# Patient Record
Sex: MALE | Race: BLACK OR AFRICAN AMERICAN
[De-identification: names, ages, dates, MRNs, and addresses within clinical notes are randomized per-mention and may not be internally consistent; named-entity substitution may affect disease eponyms.]

---

## 2019-11-13 ENCOUNTER — HOSPITAL ENCOUNTER (INPATIENT)
Dept: HOSPITAL 61 - ER | Age: 29
LOS: 1 days | Discharge: HOME | DRG: 872 | End: 2019-11-14
Attending: FAMILY MEDICINE | Admitting: FAMILY MEDICINE
Payer: COMMERCIAL

## 2019-11-13 VITALS — SYSTOLIC BLOOD PRESSURE: 126 MMHG | DIASTOLIC BLOOD PRESSURE: 77 MMHG

## 2019-11-13 VITALS — SYSTOLIC BLOOD PRESSURE: 96 MMHG | DIASTOLIC BLOOD PRESSURE: 47 MMHG

## 2019-11-13 VITALS — SYSTOLIC BLOOD PRESSURE: 107 MMHG | DIASTOLIC BLOOD PRESSURE: 57 MMHG

## 2019-11-13 VITALS — HEIGHT: 71 IN | BODY MASS INDEX: 30.8 KG/M2 | WEIGHT: 220 LBS

## 2019-11-13 VITALS — SYSTOLIC BLOOD PRESSURE: 132 MMHG | DIASTOLIC BLOOD PRESSURE: 70 MMHG

## 2019-11-13 VITALS — DIASTOLIC BLOOD PRESSURE: 57 MMHG | SYSTOLIC BLOOD PRESSURE: 129 MMHG

## 2019-11-13 DIAGNOSIS — R04.2: ICD-10-CM

## 2019-11-13 DIAGNOSIS — J05.10: ICD-10-CM

## 2019-11-13 DIAGNOSIS — Z82.49: ICD-10-CM

## 2019-11-13 DIAGNOSIS — A41.9: Primary | ICD-10-CM

## 2019-11-13 DIAGNOSIS — R49.0: ICD-10-CM

## 2019-11-13 DIAGNOSIS — J03.90: ICD-10-CM

## 2019-11-13 DIAGNOSIS — F17.210: ICD-10-CM

## 2019-11-13 LAB
% BANDS: 2 % (ref 0–9)
% LYMPHS: 9 % (ref 24–48)
% MONOS: 2 % (ref 0–10)
% SEGS: 87 % (ref 35–66)
ALBUMIN SERPL-MCNC: 4.3 G/DL (ref 3.4–5)
ALP SERPL-CCNC: 57 U/L (ref 46–116)
ALT SERPL-CCNC: 24 U/L (ref 16–63)
ANION GAP SERPL CALC-SCNC: 10 MMOL/L (ref 6–14)
APTT PPP: YELLOW S
AST SERPL-CCNC: 20 U/L (ref 15–37)
BACTERIA #/AREA URNS HPF: 0 /HPF
BASOPHILS # BLD AUTO: 0.1 X10^3/UL (ref 0–0.2)
BASOPHILS NFR BLD: 0 % (ref 0–3)
BILIRUB DIRECT SERPL-MCNC: 0.2 MG/DL (ref 0–0.2)
BILIRUB SERPL-MCNC: 0.6 MG/DL (ref 0.2–1)
BILIRUB UR QL STRIP: NEGATIVE
BUN SERPL-MCNC: 13 MG/DL (ref 8–26)
CALCIUM SERPL-MCNC: 9.2 MG/DL (ref 8.5–10.1)
CHLORIDE SERPL-SCNC: 103 MMOL/L (ref 98–107)
CO2 SERPL-SCNC: 27 MMOL/L (ref 21–32)
CREAT SERPL-MCNC: 1.1 MG/DL (ref 0.7–1.3)
EOSINOPHIL NFR BLD: 0.2 X10^3/UL (ref 0–0.7)
EOSINOPHIL NFR BLD: 1 % (ref 0–3)
ERYTHROCYTE [DISTWIDTH] IN BLOOD BY AUTOMATED COUNT: 13.1 % (ref 11.5–14.5)
FIBRINOGEN PPP-MCNC: CLEAR MG/DL
GFR SERPLBLD BASED ON 1.73 SQ M-ARVRAT: 95.8 ML/MIN
GLUCOSE SERPL-MCNC: 114 MG/DL (ref 70–99)
HCT VFR BLD CALC: 40.6 % (ref 39–53)
HGB BLD-MCNC: 13.7 G/DL (ref 13–17.5)
INFLUENZA A PATIENT: NEGATIVE
INFLUENZA B PATIENT: NEGATIVE
LYMPHOCYTES # BLD: 1.9 X10^3/UL (ref 1–4.8)
LYMPHOCYTES NFR BLD AUTO: 8 % (ref 24–48)
MCH RBC QN AUTO: 30 PG (ref 25–35)
MCHC RBC AUTO-ENTMCNC: 34 G/DL (ref 31–37)
MCV RBC AUTO: 88 FL (ref 79–100)
MONO #: 1.6 X10^3/UL (ref 0–1.1)
MONOCYTES NFR BLD: 7 % (ref 0–9)
MONONUCLEOSIS PATIENT: NEGATIVE
NEUT #: 18.8 X10^3/UL (ref 1.8–7.7)
NEUTROPHILS NFR BLD AUTO: 84 % (ref 31–73)
NITRITE UR QL STRIP: NEGATIVE
PH UR STRIP: 8 [PH]
PLATELET # BLD AUTO: 266 X10^3/UL (ref 140–400)
PLATELET # BLD EST: ADEQUATE 10*3/UL
POTASSIUM SERPL-SCNC: 3.9 MMOL/L (ref 3.5–5.1)
PROT SERPL-MCNC: 7.9 G/DL (ref 6.4–8.2)
PROT UR STRIP-MCNC: NEGATIVE MG/DL
RBC # BLD AUTO: 4.62 X10^6/UL (ref 4.3–5.7)
RBC #/AREA URNS HPF: (no result) /HPF (ref 0–2)
SODIUM SERPL-SCNC: 140 MMOL/L (ref 136–145)
SQUAMOUS #/AREA URNS LPF: (no result) /LPF
TOXIC GRANULES BLD QL SMEAR: SLIGHT
UROBILINOGEN UR-MCNC: 1 MG/DL
WBC # BLD AUTO: 22.5 X10^3/UL (ref 4–11)
WBC #/AREA URNS HPF: (no result) /HPF (ref 0–4)
WBC TOXIC VACUOLES BLD QL SMEAR: SLIGHT

## 2019-11-13 PROCEDURE — 87880 STREP A ASSAY W/OPTIC: CPT

## 2019-11-13 PROCEDURE — 94640 AIRWAY INHALATION TREATMENT: CPT

## 2019-11-13 PROCEDURE — 36415 COLL VENOUS BLD VENIPUNCTURE: CPT

## 2019-11-13 PROCEDURE — 85007 BL SMEAR W/DIFF WBC COUNT: CPT

## 2019-11-13 PROCEDURE — 86645 CMV ANTIBODY IGM: CPT

## 2019-11-13 PROCEDURE — 87804 INFLUENZA ASSAY W/OPTIC: CPT

## 2019-11-13 PROCEDURE — 70491 CT SOFT TISSUE NECK W/DYE: CPT

## 2019-11-13 PROCEDURE — 86308 HETEROPHILE ANTIBODY SCREEN: CPT

## 2019-11-13 PROCEDURE — 87086 URINE CULTURE/COLONY COUNT: CPT

## 2019-11-13 PROCEDURE — G0378 HOSPITAL OBSERVATION PER HR: HCPCS

## 2019-11-13 PROCEDURE — 96374 THER/PROPH/DIAG INJ IV PUSH: CPT

## 2019-11-13 PROCEDURE — 71045 X-RAY EXAM CHEST 1 VIEW: CPT

## 2019-11-13 PROCEDURE — 80048 BASIC METABOLIC PNL TOTAL CA: CPT

## 2019-11-13 PROCEDURE — 87070 CULTURE OTHR SPECIMN AEROBIC: CPT

## 2019-11-13 PROCEDURE — 85025 COMPLETE CBC W/AUTO DIFF WBC: CPT

## 2019-11-13 PROCEDURE — 81001 URINALYSIS AUTO W/SCOPE: CPT

## 2019-11-13 PROCEDURE — 96361 HYDRATE IV INFUSION ADD-ON: CPT

## 2019-11-13 PROCEDURE — 87040 BLOOD CULTURE FOR BACTERIA: CPT

## 2019-11-13 PROCEDURE — 80076 HEPATIC FUNCTION PANEL: CPT

## 2019-11-13 PROCEDURE — 96375 TX/PRO/DX INJ NEW DRUG ADDON: CPT

## 2019-11-13 PROCEDURE — 86644 CMV ANTIBODY: CPT

## 2019-11-13 RX ADMIN — IBUPROFEN ONE MG: 100 SUSPENSION ORAL at 02:55

## 2019-11-13 RX ADMIN — BACITRACIN SCH MLS/HR: 5000 INJECTION, POWDER, FOR SOLUTION INTRAMUSCULAR at 15:10

## 2019-11-13 RX ADMIN — BUDESONIDE SCH MG: 0.5 INHALANT RESPIRATORY (INHALATION) at 20:08

## 2019-11-13 RX ADMIN — BACITRACIN SCH MLS/HR: 5000 INJECTION, POWDER, FOR SOLUTION INTRAMUSCULAR at 23:18

## 2019-11-13 RX ADMIN — IBUPROFEN ONE MG: 100 SUSPENSION ORAL at 03:32

## 2019-11-13 RX ADMIN — MORPHINE SULFATE PRN MG: 2 INJECTION, SOLUTION INTRAMUSCULAR; INTRAVENOUS at 15:10

## 2019-11-13 RX ADMIN — Medication SCH CAP: at 21:09

## 2019-11-13 RX ADMIN — BACITRACIN SCH MLS/HR: 5000 INJECTION, POWDER, FOR SOLUTION INTRAMUSCULAR at 06:00

## 2019-11-13 RX ADMIN — DOXYCYCLINE SCH MLS/HR: 100 INJECTION, POWDER, LYOPHILIZED, FOR SOLUTION INTRAVENOUS at 23:15

## 2019-11-13 RX ADMIN — IPRATROPIUM BROMIDE AND ALBUTEROL SULFATE SCH ML: .5; 3 SOLUTION RESPIRATORY (INHALATION) at 20:08

## 2019-11-13 RX ADMIN — METHYLPREDNISOLONE SODIUM SUCCINATE SCH MG: 40 INJECTION, POWDER, FOR SOLUTION INTRAMUSCULAR; INTRAVENOUS at 21:09

## 2019-11-13 RX ADMIN — MORPHINE SULFATE PRN MG: 2 INJECTION, SOLUTION INTRAMUSCULAR; INTRAVENOUS at 19:35

## 2019-11-13 RX ADMIN — METHYLPREDNISOLONE SODIUM SUCCINATE SCH MG: 40 INJECTION, POWDER, FOR SOLUTION INTRAMUSCULAR; INTRAVENOUS at 15:10

## 2019-11-13 RX ADMIN — MORPHINE SULFATE PRN MG: 2 INJECTION, SOLUTION INTRAMUSCULAR; INTRAVENOUS at 17:31

## 2019-11-13 RX ADMIN — DOXYCYCLINE SCH MLS/HR: 100 INJECTION, POWDER, LYOPHILIZED, FOR SOLUTION INTRAVENOUS at 15:10

## 2019-11-13 RX ADMIN — IPRATROPIUM BROMIDE AND ALBUTEROL SULFATE SCH ML: .5; 3 SOLUTION RESPIRATORY (INHALATION) at 15:35

## 2019-11-13 NOTE — PDOC1
History and Physical


Date of Admission


Date of Admission


DATE: 11/13/19 


TIME: 08:49





Identification/Chief Complaint


Chief Complaint


Sore throat





Source


Source:  Patient





History of Present Illness


History of Present Illness


Mr Bobo 29 yo male with no PMHx who presents with complaint of severe sore 

throat that started at about 10 PM. Patient states that he feels like his throat

is closing up on him and he has been choking on a saliva. He indicates that he 

has had some vomiting as well and has had some hemoptysis. Patient does admit to

feeling very anxious due to feeling like he is not able to breathe and cannot 

even swallow liquids. Patient also is noted to have fever. He denies any 

abdominal pain.


He has 3 young children at home, works for the [employer], some sick contacts at 

work. He smokes a bit, drinks little, and occasionally uses MJ, but does not 

vape.


His significant other notes he has vocal changes as well.


CXR was clear in ED, however had fever 102.4F, , WBC of 22.5 and RR of 2

6/min. He improved only slightly after steroid administration and was called for

admission for further treatment.





Past Medical History


Cardiovascular:  No pertinent hx


Pulmonary:  No pertinent hx


GI:  No pertinent hx


Heme/Onc:  No pertinent hx


Hepatobiliary:  No pertinent hx


Psych:  No pertinent hx


Rheumatologic:  No pertinent hx


Infectious disease:  No pertinent hx


ENT:  No pertinent hx


Renal/:  No pertinent hx


Endocrine:  No pertinent hx


Dermatology:  No pertinent hx





Past Surgical History


Past Surgical History:  No pertinent history





Family History


Family History:  Hypertension





Social History


Smoke:  <1 pack per day


ALCOHOL:  rare


Drugs:  Marijuana





Current Problem List


Problem List


Problems


Medical Problems:


(1) Fever


Status: Acute  





(2) Leukocytosis


Status: Acute  





(3) Tonsillitis


Status: Acute  











Current Medications


Current Medications





Current Medications


Diphenhydramine HCl (Benadryl) 50 mg 1X  ONCE IVP  Last administered on 

11/13/19at 02:56;  Start 11/13/19 at 03:00;  Stop 11/13/19 at 03:01;  Status DC


Methylprednisolone Sodium Succinate (SOLU-Medrol 125MG VIAL) 125 mg 1X  ONCE IV 

Last administered on 11/13/19at 02:55;  Start 11/13/19 at 03:00;  Stop 11/13/19 

at 03:01;  Status DC


Lorazepam (Ativan Inj) 1 mg 1X  ONCE IVP  Last administered on 11/13/19at 02:56;

 Start 11/13/19 at 03:00;  Stop 11/13/19 at 03:01;  Status DC


Ceftriaxone Sodium (Rocephin) 1 gm 1X  ONCE IVP  Last administered on 11/13/19at

02:57;  Start 11/13/19 at 03:00;  Stop 11/13/19 at 03:01;  Status DC


Ibuprofen (Children'S Motrin) 600 mg 1X  ONCE PO ;  Start 11/13/19 at 03:00;  

Stop 11/13/19 at 03:01;  Status DC


Ketorolac Tromethamine (Toradol 30mg Vial) 30 mg 1X  ONCE IVP  Last administered

on 11/13/19at 03:34;  Start 11/13/19 at 04:00;  Stop 11/13/19 at 04:01;  Status 

DC


Multi-Ingredient Mouthwash/Gargle (Gi Cocktail) 20 ml 1X  ONCE SWSW  Last 

administered on 11/13/19at 03:33;  Start 11/13/19 at 04:00;  Stop 11/13/19 at 

04:01;  Status DC


Sodium Chloride 1,000 ml @  1,000 mls/hr 1X  ONCE IV  Last administered on 

11/13/19at 04:02;  Start 11/13/19 at 04:00;  Stop 11/13/19 at 04:59;  Status DC


Lorazepam (Ativan Inj) 1 mg 1X  ONCE IVP ;  Start 11/13/19 at 04:00;  Stop 

11/13/19 at 04:01;  Status DC


Ondansetron HCl (Zofran) 4 mg PRN Q8HRS  PRN IV NAUSEA/VOMITING 1ST CHOICE;  

Start 11/13/19 at 05:45;  Stop 11/14/19 at 05:44


Morphine Sulfate (Morphine Sulfate) 2 mg PRN Q2HR  PRN IV SEVERE PAIN 7-10;  

Start 11/13/19 at 05:45;  Stop 11/14/19 at 05:44


Sodium Chloride 1,000 ml @  125 mls/hr Q8H IV ;  Start 11/13/19 at 06:00;  Stop 

11/14/19 at 05:59


Acetaminophen (Tylenol) 650 mg PRN Q4HRS  PRN PO FEVER;  Start 11/13/19 at 

05:45;  Stop 11/14/19 at 05:44





Allergies


Allergies:  


Coded Allergies:  


     No Known Drug Allergies (Unverified , 11/13/19)





ROS


General:  YES: Fatigue, Malaise; 


   No: Chills, Night Sweats, Appetite, Other


PSYCHOLOGICAL ROS:  No: Anxiety, Behavioral Disorder, Concentration difficultie,

Decreased libido, Depression, Disorientation, Hallucinations, Hostility, 

Irritablity, Memory difficulties, Mood Swings, Obsessive thoughts, Physical 

abuse, Sexual abuse, Sleep disturbances, Suicidal ideation, Other


Eyes:  No Blurry vision, No Decreased vision, No Double vision, No Dry eyes, No 

Excessive tearing, No Eye Pain, No Itchy Eyes, No Loss of vision, No 

Photophobia, No Scotomata, No Uses contacts, No Uses glasses, No Other


HEENT:  YES: Sore Throat, Vocal changes; 


   No: Heacaches, Visual Changes, Hearing change, Nasal congestion, Nasal 

discharge, Oral lesions, Sinus pain, Epistaxis, Sneezing, Snoring, Tinnitus, 

Vertigo, Other


ALLERGY AND IMMUNOLOGY:  No: Hives, Insect Bite Sensitivity, Itchy/Watery Eyes, 

Nasal Congestion, Post Nasal Drip, Seasonal Allergies, Other


Hematological and Lymphatic:  No: Bleeding Problems, Blood Clots, Blood 

Transfusions, Brusing, Night Sweats, Pallor, Swollen Lymph Nodes, Other


ENDOCRINE:  No: Breast Changes, Galactorrhea, Hair Pattern Changes, Hot Flashes,

Malaise/lethargy, Mood Swings, Palpitations, Polydipsia/polyuria, Skin Changes, 

Temperature Intolerance, Unexpected Weight Changes, Other


Breast:  No New/Changing Breast Lumps, No Nipple changes, No Nipple discharge, 

No Other


Respiratory:  YES: Shortness of breath, SOB with excertion; 


   No: Cough, Hemoptysis, Orthopnea, Pleuritic Pain, Sputum Changes, Stridor, 

Tachypnea, Wheezing, Other


Cardiovascular:  No Chest Pain, No Palpitations, No Orthopnea, No Paroxysmal 

Noc. Dyspnea, No Edema, No Lt Headedness, No Other


Gastrointestinal:  Yes Vomiting; 


   No Nausea, No Abdominal Pain, No Diarrhea, No Constipation, No Melena, No 

Hematochezia, No Other


Genitourinary:  No Dysuria, No Frequency, No Incontinence, No Hematuria, No 

Retention, No Discharge, No Urgency, No Pain, No Flank Pain, No Other, No , No ,

No , No , No , No , No 


Musculoskeletal:  No Gait Disturbance, No Joint Pain, No Joint Stiffness, No 

Joint Swelling, No Muscle Pain, No Muscular Weakness, No Pain In:, No Swelling 

In:, No Other


Neurological:  No Behavorial Changes, No Bowel/Bladder ControlChng, No 

Confusion, No Dizziness, No Gait Disturbance, No Headaches, No Impaired 

Coord/balance, No Memory Loss, No Numbness/Tingling, No Seizures, No Speech 

Problems, No Tremors, No Visual Changes, No Weakness, No Other


Skin:  No Dry Skin, No Eczema, No Hair Changes, No Lumps, No Mole Changes, No 

Mottling, No Nail Changes, No Pruritus, No Rash, No Skin Lesion Changes, No 

Other, No Acne





Physical Exam


General:  Alert, Oriented X3, Cooperative, No acute distress


HEENT:  Atraumatic, PERRLA, EOMI, Mucous membr. moist/pink, Other (Enlarged 

epliglottis, inflamed. Tonsils not swollen, posterior pharyngeal erythema, 

Bilateral cervical adenopathy)


Lungs:  Other (Tracheal breath sounds raspy)


Abdomen:  Normal bowel sounds, Soft, No tenderness, No hepatosplenomegaly, No 

masses


Rectal Exam:  not examined


Extremities:  No clubbing, No cyanosis, No edema, Normal pulses, No 

tenderness/swelling


Skin:  No rashes, No breakdown, No significant lesion


Neuro:  Normal gait, Normal speech, Strength at 5/5 X4 ext, Normal tone, 

Sensation intact, Cranial nerves 3-12 NL, Reflexes 2+


Psych/Mental Status:  Mental status NL, Mood NL





Vitals


Vitals





Vital Signs








  Date Time  Temp Pulse Resp B/P (MAP) Pulse Ox O2 Delivery O2 Flow Rate FiO2


 


11/13/19 07:00 97.5 93 18 129/57 (81) 98 Room Air  





 97.5       











Labs


Labs





Laboratory Tests








Test


 11/13/19


02:30 11/13/19


02:48 11/13/19


03:07 11/13/19


04:43


 


White Blood Count


 22.5 x10^3/uL


(4.0-11.0) 


 


 





 


Red Blood Count


 4.62 x10^6/uL


(4.30-5.70) 


 


 





 


Hemoglobin


 13.7 g/dL


(13.0-17.5) 


 


 





 


Hematocrit


 40.6 %


(39.0-53.0) 


 


 





 


Mean Corpuscular Volume 88 fL ()    


 


Mean Corpuscular Hemoglobin 30 pg (25-35)    


 


Mean Corpuscular Hemoglobin


Concent 34 g/dL


(31-37) 


 


 





 


Red Cell Distribution Width


 13.1 %


(11.5-14.5) 


 


 





 


Platelet Count


 266 x10^3/uL


(140-400) 


 


 





 


Neutrophils (%) (Auto) 84 % (31-73)    


 


Lymphocytes (%) (Auto) 8 % (24-48)    


 


Monocytes (%) (Auto) 7 % (0-9)    


 


Eosinophils (%) (Auto) 1 % (0-3)    


 


Basophils (%) (Auto) 0 % (0-3)    


 


Neutrophils # (Auto)


 18.8 x10^3/uL


(1.8-7.7) 


 


 





 


Lymphocytes # (Auto)


 1.9 x10^3/uL


(1.0-4.8) 


 


 





 


Monocytes # (Auto)


 1.6 x10^3/uL


(0.0-1.1) 


 


 





 


Eosinophils # (Auto)


 0.2 x10^3/uL


(0.0-0.7) 


 


 





 


Basophils # (Auto)


 0.1 x10^3/uL


(0.0-0.2) 


 


 





 


Segmented Neutrophils % 87 % (35-66)    


 


Band Neutrophils % 2 % (0-9)    


 


Lymphocytes % 9 % (24-48)    


 


Monocytes % 2 % (0-10)    


 


Toxic Granulation Slight    


 


Toxic Vacuolation Slight    


 


Platelet Estimate


 Adequate


(ADEQUATE) 


 


 





 


Group A Streptococcus Rapid


 Negative


(NEGATIVE) 


 


 





 


Influenza Type A Antigen


 


 Negative


(NEGATIVE) 


 





 


Influenza Type B Antigen


 


 Negative


(NEGATIVE) 


 





 


Sodium Level


 


 


 140 mmol/L


(136-145) 





 


Potassium Level


 


 


 3.9 mmol/L


(3.5-5.1) 





 


Chloride Level


 


 


 103 mmol/L


() 





 


Carbon Dioxide Level


 


 


 27 mmol/L


(21-32) 





 


Anion Gap   10 (6-14)  


 


Blood Urea Nitrogen


 


 


 13 mg/dL


(8-26) 





 


Creatinine


 


 


 1.1 mg/dL


(0.7-1.3) 





 


Estimated GFR


(Cockcroft-Gault) 


 


 95.8 


 





 


Glucose Level


 


 


 114 mg/dL


(70-99) 





 


Calcium Level


 


 


 9.2 mg/dL


(8.5-10.1) 





 


Total Bilirubin


 


 


 0.6 mg/dL


(0.2-1.0) 





 


Direct Bilirubin


 


 


 0.2 mg/dL


(0.0-0.2) 





 


Aspartate Amino Transf


(AST/SGOT) 


 


 20 U/L (15-37) 


 





 


Alanine Aminotransferase


(ALT/SGPT) 


 


 24 U/L (16-63) 


 





 


Alkaline Phosphatase


 


 


 57 U/L


() 





 


Total Protein


 


 


 7.9 g/dL


(6.4-8.2) 





 


Albumin


 


 


 4.3 g/dL


(3.4-5.0) 





 


Urine Collection Type    Unknown 


 


Urine Color    Yellow 


 


Urine Clarity    Clear 


 


Urine pH    8.0 


 


Urine Specific Gravity    1.025 


 


Urine Protein


 


 


 


 Negative mg/dL


(NEG-TRACE)


 


Urine Glucose (UA)


 


 


 


 Negative mg/dL


(NEG)


 


Urine Ketones (Stick)


 


 


 


 Negative mg/dL


(NEG)


 


Urine Blood    Negative (NEG) 


 


Urine Nitrite    Negative (NEG) 


 


Urine Bilirubin    Negative (NEG) 


 


Urine Urobilinogen Dipstick


 


 


 


 1.0 mg/dL (0.2


mg/dL)


 


Urine Leukocyte Esterase    Negative (NEG) 


 


Urine RBC    3-5 /HPF (0-2) 


 


Urine WBC


 


 


 


 5-10 /HPF


(0-4)


 


Urine Squamous Epithelial


Cells 


 


 


 Few /LPF 





 


Urine Bacteria    0 /HPF (0-FEW) 


 


Urine Mucus    Slight /LPF 








Laboratory Tests








Test


 11/13/19


02:30 11/13/19


02:48 11/13/19


03:07 11/13/19


04:43


 


White Blood Count


 22.5 x10^3/uL


(4.0-11.0) 


 


 





 


Red Blood Count


 4.62 x10^6/uL


(4.30-5.70) 


 


 





 


Hemoglobin


 13.7 g/dL


(13.0-17.5) 


 


 





 


Hematocrit


 40.6 %


(39.0-53.0) 


 


 





 


Mean Corpuscular Volume 88 fL ()    


 


Mean Corpuscular Hemoglobin 30 pg (25-35)    


 


Mean Corpuscular Hemoglobin


Concent 34 g/dL


(31-37) 


 


 





 


Red Cell Distribution Width


 13.1 %


(11.5-14.5) 


 


 





 


Platelet Count


 266 x10^3/uL


(140-400) 


 


 





 


Neutrophils (%) (Auto) 84 % (31-73)    


 


Lymphocytes (%) (Auto) 8 % (24-48)    


 


Monocytes (%) (Auto) 7 % (0-9)    


 


Eosinophils (%) (Auto) 1 % (0-3)    


 


Basophils (%) (Auto) 0 % (0-3)    


 


Neutrophils # (Auto)


 18.8 x10^3/uL


(1.8-7.7) 


 


 





 


Lymphocytes # (Auto)


 1.9 x10^3/uL


(1.0-4.8) 


 


 





 


Monocytes # (Auto)


 1.6 x10^3/uL


(0.0-1.1) 


 


 





 


Eosinophils # (Auto)


 0.2 x10^3/uL


(0.0-0.7) 


 


 





 


Basophils # (Auto)


 0.1 x10^3/uL


(0.0-0.2) 


 


 





 


Segmented Neutrophils % 87 % (35-66)    


 


Band Neutrophils % 2 % (0-9)    


 


Lymphocytes % 9 % (24-48)    


 


Monocytes % 2 % (0-10)    


 


Toxic Granulation Slight    


 


Toxic Vacuolation Slight    


 


Platelet Estimate


 Adequate


(ADEQUATE) 


 


 





 


Group A Streptococcus Rapid


 Negative


(NEGATIVE) 


 


 





 


Influenza Type A Antigen


 


 Negative


(NEGATIVE) 


 





 


Influenza Type B Antigen


 


 Negative


(NEGATIVE) 


 





 


Sodium Level


 


 


 140 mmol/L


(136-145) 





 


Potassium Level


 


 


 3.9 mmol/L


(3.5-5.1) 





 


Chloride Level


 


 


 103 mmol/L


() 





 


Carbon Dioxide Level


 


 


 27 mmol/L


(21-32) 





 


Anion Gap   10 (6-14)  


 


Blood Urea Nitrogen


 


 


 13 mg/dL


(8-26) 





 


Creatinine


 


 


 1.1 mg/dL


(0.7-1.3) 





 


Estimated GFR


(Cockcroft-Gault) 


 


 95.8 


 





 


Glucose Level


 


 


 114 mg/dL


(70-99) 





 


Calcium Level


 


 


 9.2 mg/dL


(8.5-10.1) 





 


Total Bilirubin


 


 


 0.6 mg/dL


(0.2-1.0) 





 


Direct Bilirubin


 


 


 0.2 mg/dL


(0.0-0.2) 





 


Aspartate Amino Transf


(AST/SGOT) 


 


 20 U/L (15-37) 


 





 


Alanine Aminotransferase


(ALT/SGPT) 


 


 24 U/L (16-63) 


 





 


Alkaline Phosphatase


 


 


 57 U/L


() 





 


Total Protein


 


 


 7.9 g/dL


(6.4-8.2) 





 


Albumin


 


 


 4.3 g/dL


(3.4-5.0) 





 


Urine Collection Type    Unknown 


 


Urine Color    Yellow 


 


Urine Clarity    Clear 


 


Urine pH    8.0 


 


Urine Specific Gravity    1.025 


 


Urine Protein


 


 


 


 Negative mg/dL


(NEG-TRACE)


 


Urine Glucose (UA)


 


 


 


 Negative mg/dL


(NEG)


 


Urine Ketones (Stick)


 


 


 


 Negative mg/dL


(NEG)


 


Urine Blood    Negative (NEG) 


 


Urine Nitrite    Negative (NEG) 


 


Urine Bilirubin    Negative (NEG) 


 


Urine Urobilinogen Dipstick


 


 


 


 1.0 mg/dL (0.2


mg/dL)


 


Urine Leukocyte Esterase    Negative (NEG) 


 


Urine RBC    3-5 /HPF (0-2) 


 


Urine WBC


 


 


 


 5-10 /HPF


(0-4)


 


Urine Squamous Epithelial


Cells 


 


 


 Few /LPF 





 


Urine Bacteria    0 /HPF (0-FEW) 


 


Urine Mucus    Slight /LPF 











Images


Images


CXR - No consolidation or pleural effusion. Normal heart size.


 


Impression: 


1.  No acute cardiopulmonary process.





VTE Prophylaxis Ordered


VTE Prophylaxis Devices:  No


VTE Pharmacological Prophylaxi:  No





Assessment/Plan


Assessment/Plan


A/P:


Shortness of breath - sounds like tracheitis, epliglottis given his dysphagia 

and dysphonia. Needs prn racemic epinephrine, nebs, steroids, empiric antibi

otics to cover for strep, h. influenza, etc. though this might be viral. Will 

get CT neck stat to check for abscess. consult ID


Dysphagia - will check CT as above. Have SLP evaluate, though this is likely 

epiglottitis


Dysphonia - as above


Sepsis - likely 2/2 epliglottitis, will have empiric antibiotics, fluids given





FEN - General diet


PPX - SCDs


FULL CODE


Dispo - inpatient for sepsis, life-threatening epiglottitis.











FLOR COCHRAN MD        Nov 13, 2019 08:49

## 2019-11-13 NOTE — RAD
Examination: CT SOFT TISSUE NECK W/CONTRAST

 

History: Swelling, difficulty swallowing

 

Comparison/Correlation: None

 

Findings: Axial images of the neck were obtained following IV contrast. 

Sagittal and coronal reformatted images were provided.

 

Prominent, symmetric adenoidal soft tissues noted.

 

Diffuse symmetric thickening of the epiglottis is present. Epiglottis 

thickness of up to 0.8 cm anteroposterior is present.

 

True and false cords are symmetric. Diffusely thickened symmetric false 

cords noted.

 

Parotid and submandibular glands are unremarkable. No significant chronic 

paranasal sinusitis. Mastoid air cells are clear.

 

Thyroid gland is normal. No enlarged lymph nodes. No loculated 

collections. The visualized trachea and esophagus are unremarkable.

 

Significant dental caries involvement of the right upper third molar 

posteriorly noted. Absence of a few crowns noted. Temporomandibular joints

are unremarkable.

 

Impression:

Significant diffuse thickening of the epiglottis and false cords. No 

loculated collection. No definite inflammatory stranding.

 

No enlarged cervical lymph nodes.

 

Carious involvement of the right upper third molar.

 

 

PQRS Compliance Statement:

 

One or more of the following individualized dose reduction techniques were

utilized for this examination:  

1. Automated exposure control  

2. Adjustment of the mA and/or kV according to patient size  

3. Use of iterative reconstruction technique

 

Electronically signed by: Bryan Hernandez MD (11/13/2019 12:49 PM) [facility]

## 2019-11-13 NOTE — RAD
CHEST AP ONLY

 

History: Fever

 

Comparison: None.

 

Findings:

No consolidation or pleural effusion. Normal heart size.

 

Impression: 

1.  No acute cardiopulmonary process.

 

Electronically signed by: Ladarius Zepeda DO (11/13/2019 4:54 AM) [id number]

## 2019-11-13 NOTE — PHYS DOC
Past Medical History


Past Medical History:  No Pertinent History


Past Surgical History:  No Surgical History


Alcohol Use:  Occasionally


Drug Use:  None





Adult General


Chief Complaint


Chief Complaint:  SORE THROAT





HPI


HPI





Patient is a 28-year-old male who presents with complaint of severe sore throat 

that started at about 10 PM. Patient states that he feels like his throat is 

closing up on him and he has been choking on a saliva. He indicates that he has 

had some vomiting as well. Patient does admit to feeling very anxious due to 

feeling like he is not able to breathe. Patient also is noted to have fever. He 

denies any abdominal pain.[]





Review of Systems


Review of Systems





Constitutional: Positive fever and chills []


HENT: [place] of sore throat []


Respiratory: [place] of shortness of breath []


Cardiovascular: No additional information not addressed in HPI []


GI: Denies abdominal pain. Complains of nausea and vomiting without diarrhea []


Integument: Denies rash or skin lesions []


Neurologic: Denies headache, focal weakness or sensory changes []





All other systems were reviewed and found to be within normal limits, except as 

documented in this note.





Current Medications


Current Medications





Current Medications








 Medications


  (Trade)  Dose


 Ordered  Sig/Maria D  Start Time


 Stop Time Status Last Admin


Dose Admin


 


 Ceftriaxone Sodium


  (Rocephin)  1 gm  1X  ONCE  11/13/19 03:00


 11/13/19 03:01 DC 11/13/19 02:57


1 GM


 


 Diphenhydramine


 HCl


  (Benadryl)  50 mg  1X  ONCE  11/13/19 03:00


 11/13/19 03:01 DC 11/13/19 02:56


50 MG


 


 Ibuprofen


  (Children'S


 Motrin)  600 mg  1X  ONCE  11/13/19 03:00


 11/13/19 03:01 DC  





 


 Ketorolac


 Tromethamine


  (Toradol 30mg


 Vial)  30 mg  1X  ONCE  11/13/19 04:00


 11/13/19 04:01 DC 11/13/19 03:34


30 MG


 


 Lorazepam


  (Ativan Inj)  1 mg  1X  ONCE  11/13/19 04:00


 11/13/19 04:01 DC  





 


 Methylprednisolone


 Sodium Succinate


  (SOLU-Medrol


 125MG VIAL)  125 mg  1X  ONCE  11/13/19 03:00


 11/13/19 03:01 DC 11/13/19 02:55


125 MG


 


 Multi-Ingredient


 Mouthwash/Gargle


  (Gi Cocktail)  20 ml  1X  ONCE  11/13/19 04:00


 11/13/19 04:01 DC 11/13/19 03:33


20 ML


 


 Sodium Chloride  1,000 ml @ 


 1,000 mls/hr  1X  ONCE  11/13/19 04:00


 11/13/19 04:59 DC 11/13/19 04:02


1,000 MLS/HR











Allergies


Allergies





Allergies








Coded Allergies Type Severity Reaction Last Updated Verified


 


  No Known Drug Allergies    11/13/19 No











Physical Exam


Physical Exam





Constitutional: Well developed, well nourished, non-toxic appearance. []


HENT: Normocephalic, atraumatic, bilateral external ears normal, with pharyngeal

 erythema but no exudates. []


Eyes: PERRLA, EOMI, conjunctiva normal, no discharge. [] 


Neck: Normal range of motion, no tenderness, supple, with anterior cervical ly

mphadenopathy. [] 


Cardiovascular: Tachycardic rate with regular rhythm[]


Lungs & Thorax:  Bilateral breath sounds clear to auscultation []


Abdomen: Bowel sounds normal, soft, no tenderness. [] 


Skin: Warm, dry, no erythema, no rash. [] 


Extremities: No tenderness, no cyanosis, no clubbing, ROM intact, no edema. [] 


Neurologic: Alert and oriented X 3, no focal deficits noted. []


Psychologic: Severely anxious. []





Current Patient Data


Vital Signs





                                   Vital Signs








  Date Time  Temp Pulse Resp B/P (MAP) Pulse Ox O2 Delivery O2 Flow Rate FiO2


 


11/13/19 04:30  101 16 107/56 (73) 98 Room Air  


 


11/13/19 04:00 98.3       





 98.3       








Lab Values





                                Laboratory Tests








Test


 11/13/19


02:30 11/13/19


02:48 11/13/19


03:07 11/13/19


04:43


 


White Blood Count


 22.5 x10^3/uL


(4.0-11.0)  H 


 


 





 


Red Blood Count


 4.62 x10^6/uL


(4.30-5.70) 


 


 





 


Hemoglobin


 13.7 g/dL


(13.0-17.5) 


 


 





 


Hematocrit


 40.6 %


(39.0-53.0) 


 


 





 


Mean Corpuscular Volume


 88 fL ()


 


 


 





 


Mean Corpuscular Hemoglobin 30 pg (25-35)     


 


Mean Corpuscular Hemoglobin


Concent 34 g/dL


(31-37) 


 


 





 


Red Cell Distribution Width


 13.1 %


(11.5-14.5) 


 


 





 


Platelet Count


 266 x10^3/uL


(140-400) 


 


 





 


Neutrophils (%) (Auto) 84 % (31-73)  H   


 


Lymphocytes (%) (Auto) 8 % (24-48)  L   


 


Monocytes (%) (Auto) 7 % (0-9)     


 


Eosinophils (%) (Auto) 1 % (0-3)     


 


Basophils (%) (Auto) 0 % (0-3)     


 


Neutrophils # (Auto)


 18.8 x10^3/uL


(1.8-7.7)  H 


 


 





 


Lymphocytes # (Auto)


 1.9 x10^3/uL


(1.0-4.8) 


 


 





 


Monocytes # (Auto)


 1.6 x10^3/uL


(0.0-1.1)  H 


 


 





 


Eosinophils # (Auto)


 0.2 x10^3/uL


(0.0-0.7) 


 


 





 


Basophils # (Auto)


 0.1 x10^3/uL


(0.0-0.2) 


 


 





 


Segmented Neutrophils % 87 % (35-66)  H   


 


Band Neutrophils % 2 % (0-9)     


 


Lymphocytes % 9 % (24-48)  L   


 


Monocytes % 2 % (0-10)     


 


Toxic Granulation Slight     


 


Toxic Vacuolation Slight     


 


Platelet Estimate


 Adequate


(ADEQUATE) 


 


 





 


Influenza Type A Antigen


 


 Negative


(NEGATIVE) 


 





 


Influenza Type B Antigen


 


 Negative


(NEGATIVE) 


 





 


Sodium Level


 


 


 140 mmol/L


(136-145) 





 


Potassium Level


 


 


 3.9 mmol/L


(3.5-5.1) 





 


Chloride Level


 


 


 103 mmol/L


() 





 


Carbon Dioxide Level


 


 


 27 mmol/L


(21-32) 





 


Anion Gap   10 (6-14)   


 


Blood Urea Nitrogen


 


 


 13 mg/dL


(8-26) 





 


Creatinine


 


 


 1.1 mg/dL


(0.7-1.3) 





 


Estimated GFR


(Cockcroft-Gault) 


 


 95.8  


 





 


Glucose Level


 


 


 114 mg/dL


(70-99)  H 





 


Calcium Level


 


 


 9.2 mg/dL


(8.5-10.1) 





 


Total Bilirubin


 


 


 0.6 mg/dL


(0.2-1.0) 





 


Direct Bilirubin


 


 


 0.2 mg/dL


(0.0-0.2) 





 


Aspartate Amino Transferase


(AST) 


 


 20 U/L (15-37)


 





 


Alanine Aminotransferase (ALT)


 


 


 24 U/L (16-63)


 





 


Alkaline Phosphatase


 


 


 57 U/L


() 





 


Total Protein


 


 


 7.9 g/dL


(6.4-8.2) 





 


Albumin


 


 


 4.3 g/dL


(3.4-5.0) 





 


Urine Collection Type    Unknown  


 


Urine Color    Yellow  


 


Urine Clarity    Clear  


 


Urine pH    8.0  


 


Urine Specific Gravity    1.025  


 


Urine Protein


 


 


 


 Negative mg/dL


(NEG-TRACE)


 


Urine Glucose (UA)


 


 


 


 Negative mg/dL


(NEG)


 


Urine Ketones (Stick)


 


 


 


 Negative mg/dL


(NEG)


 


Urine Blood


 


 


 


 Negative (NEG)





 


Urine Nitrite


 


 


 


 Negative (NEG)





 


Urine Bilirubin


 


 


 


 Negative (NEG)





 


Urine Urobilinogen Dipstick


 


 


 


 1.0 mg/dL (0.2


mg/dL)


 


Urine Leukocyte Esterase


 


 


 


 Negative (NEG)





 


Urine RBC


 


 


 


 3-5 /HPF (0-2)





 


Urine WBC


 


 


 


 5-10 /HPF


(0-4)


 


Urine Squamous Epithelial


Cells 


 


 


 Few /LPF  





 


Urine Bacteria


 


 


 


 0 /HPF (0-FEW)





 


Urine Mucus    Slight /LPF  





                                Laboratory Tests


11/13/19 02:30








                                Laboratory Tests


11/13/19 03:07














EKG


EKG


[]





Radiology/Procedures


Radiology/Procedures


[]





Course & Med Decision Making


Course & Med Decision Making


Pertinent Labs and Imaging studies reviewed. (See chart for details)





[]





Dragon Disclaimer


Dragon Disclaimer


This electronic medical record was generated, in whole or in part, using a voice

 recognition dictation system.





Departure


Departure


Impression:  


   Primary Impression:  


   Fever


   Additional Impressions:  


   Leukocytosis


   Tonsillitis


Disposition:  09 ADMITTED AS INPATIENT


Admitting Physician:  TOMASA (Dr North)


Condition:  IMPROVED


Referrals:  


NO PCP (PCP)





Problem Qualifiers








   Primary Impression:  


   Fever


   Fever type:  unspecified  Qualified Codes:  R50.9 - Fever, unspecified


   Additional Impressions:  


   Leukocytosis


   Leukocytosis type:  unspecified  Qualified Codes:  D72.829 - Elevated white 

   blood cell count, unspecified








SATNAM TINEO Jr. DO          Nov 13, 2019 04:42

## 2019-11-14 VITALS — SYSTOLIC BLOOD PRESSURE: 114 MMHG | DIASTOLIC BLOOD PRESSURE: 68 MMHG

## 2019-11-14 VITALS — SYSTOLIC BLOOD PRESSURE: 120 MMHG | DIASTOLIC BLOOD PRESSURE: 69 MMHG

## 2019-11-14 RX ADMIN — DOXYCYCLINE SCH MLS/HR: 100 INJECTION, POWDER, LYOPHILIZED, FOR SOLUTION INTRAVENOUS at 07:48

## 2019-11-14 RX ADMIN — MORPHINE SULFATE PRN MG: 2 INJECTION, SOLUTION INTRAMUSCULAR; INTRAVENOUS at 02:50

## 2019-11-14 RX ADMIN — Medication SCH CAP: at 07:47

## 2019-11-14 RX ADMIN — BUDESONIDE SCH MG: 0.5 INHALANT RESPIRATORY (INHALATION) at 07:18

## 2019-11-14 RX ADMIN — METHYLPREDNISOLONE SODIUM SUCCINATE SCH MG: 40 INJECTION, POWDER, FOR SOLUTION INTRAMUSCULAR; INTRAVENOUS at 06:21

## 2019-11-14 RX ADMIN — IPRATROPIUM BROMIDE AND ALBUTEROL SULFATE SCH ML: .5; 3 SOLUTION RESPIRATORY (INHALATION) at 07:17

## 2019-11-14 NOTE — PDOC3
Discharge Summary


Visit Information


Date of Admission:  Nov 13, 2019


Date of Discharge:  Nov 14, 2019


Admitting Diagnosis:  Dysphonia


Final Diagnosis


Problems


Medical Problems:


(1) Fever


Status: Acute  





(2) Leukocytosis


Status: Acute  





(3) Tonsillitis


Status: Acute  











Brief Hospital Course


Allergies





                                    Allergies








Coded Allergies Type Severity Reaction Last Updated Verified


 


  No Known Drug Allergies    11/13/19 No








Vital Signs





Vital Signs








  Date Time  Temp Pulse Resp B/P (MAP) Pulse Ox O2 Delivery O2 Flow Rate FiO2


 


11/14/19 07:28      Room Air  


 


11/14/19 07:19     99   


 


11/14/19 07:00 97.9 84 16 120/69 (86)    





 97.9       








Lab Results





Laboratory Tests








Test


 11/13/19


02:30 11/13/19


02:48 11/13/19


03:07 11/13/19


04:43


 


White Blood Count


 22.5 x10^3/uL


(4.0-11.0) 


 


 





 


Red Blood Count


 4.62 x10^6/uL


(4.30-5.70) 


 


 





 


Hemoglobin


 13.7 g/dL


(13.0-17.5) 


 


 





 


Hematocrit


 40.6 %


(39.0-53.0) 


 


 





 


Mean Corpuscular Volume 88 fL ()    


 


Mean Corpuscular Hemoglobin 30 pg (25-35)    


 


Mean Corpuscular Hemoglobin


Concent 34 g/dL


(31-37) 


 


 





 


Red Cell Distribution Width


 13.1 %


(11.5-14.5) 


 


 





 


Platelet Count


 266 x10^3/uL


(140-400) 


 


 





 


Neutrophils (%) (Auto) 84 % (31-73)    


 


Lymphocytes (%) (Auto) 8 % (24-48)    


 


Monocytes (%) (Auto) 7 % (0-9)    


 


Eosinophils (%) (Auto) 1 % (0-3)    


 


Basophils (%) (Auto) 0 % (0-3)    


 


Neutrophils # (Auto)


 18.8 x10^3/uL


(1.8-7.7) 


 


 





 


Lymphocytes # (Auto)


 1.9 x10^3/uL


(1.0-4.8) 


 


 





 


Monocytes # (Auto)


 1.6 x10^3/uL


(0.0-1.1) 


 


 





 


Eosinophils # (Auto)


 0.2 x10^3/uL


(0.0-0.7) 


 


 





 


Basophils # (Auto)


 0.1 x10^3/uL


(0.0-0.2) 


 


 





 


Segmented Neutrophils % 87 % (35-66)    


 


Band Neutrophils % 2 % (0-9)    


 


Lymphocytes % 9 % (24-48)    


 


Monocytes % 2 % (0-10)    


 


Toxic Granulation Slight    


 


Toxic Vacuolation Slight    


 


Platelet Estimate


 Adequate


(ADEQUATE) 


 


 





 


Cytomegalovirus IgG Antibody


 <0.60 U/mL


(0.00-0.59) 


 


 





 


Cytomegalovirus IgM Antibody


 <30.0 AU/mL


(0.0-29.9) 


 


 





 


Heterophil Agglutinins


 Negative


(NEGATIVE) 


 


 





 


Group A Streptococcus Rapid


 Negative


(NEGATIVE) 


 


 





 


Influenza Type A Antigen


 


 Negative


(NEGATIVE) 


 





 


Influenza Type B Antigen


 


 Negative


(NEGATIVE) 


 





 


Sodium Level


 


 


 140 mmol/L


(136-145) 





 


Potassium Level


 


 


 3.9 mmol/L


(3.5-5.1) 





 


Chloride Level


 


 


 103 mmol/L


() 





 


Carbon Dioxide Level


 


 


 27 mmol/L


(21-32) 





 


Anion Gap   10 (6-14)  


 


Blood Urea Nitrogen


 


 


 13 mg/dL


(8-26) 





 


Creatinine


 


 


 1.1 mg/dL


(0.7-1.3) 





 


Estimated GFR


(Cockcroft-Gault) 


 


 95.8 


 





 


Glucose Level


 


 


 114 mg/dL


(70-99) 





 


Calcium Level


 


 


 9.2 mg/dL


(8.5-10.1) 





 


Total Bilirubin


 


 


 0.6 mg/dL


(0.2-1.0) 





 


Direct Bilirubin


 


 


 0.2 mg/dL


(0.0-0.2) 





 


Aspartate Amino Transf


(AST/SGOT) 


 


 20 U/L (15-37) 


 





 


Alanine Aminotransferase


(ALT/SGPT) 


 


 24 U/L (16-63) 


 





 


Alkaline Phosphatase


 


 


 57 U/L


() 





 


Total Protein


 


 


 7.9 g/dL


(6.4-8.2) 





 


Albumin


 


 


 4.3 g/dL


(3.4-5.0) 





 


Urine Collection Type    Unknown 


 


Urine Color    Yellow 


 


Urine Clarity    Clear 


 


Urine pH    8.0 


 


Urine Specific Gravity    1.025 


 


Urine Protein


 


 


 


 Negative mg/dL


(NEG-TRACE)


 


Urine Glucose (UA)


 


 


 


 Negative mg/dL


(NEG)


 


Urine Ketones (Stick)


 


 


 


 Negative mg/dL


(NEG)


 


Urine Blood    Negative (NEG) 


 


Urine Nitrite    Negative (NEG) 


 


Urine Bilirubin    Negative (NEG) 


 


Urine Urobilinogen Dipstick


 


 


 


 1.0 mg/dL (0.2


mg/dL)


 


Urine Leukocyte Esterase    Negative (NEG) 


 


Urine RBC    3-5 /HPF (0-2) 


 


Urine WBC


 


 


 


 5-10 /HPF


(0-4)


 


Urine Squamous Epithelial


Cells 


 


 


 Few /LPF 





 


Urine Bacteria    0 /HPF (0-FEW) 


 


Urine Mucus    Slight /LPF 








Brief Hospital Course


Mr Bobo 29 yo male with no PMHx who presents with complaint of severe sore 

throat that started at about 10 PM. Patient states that he feels like his throat

is closing up on him and he has been choking on a saliva. He indicates that he 

has had some vomiting as well and has had some hemoptysis. Patient does admit to

feeling very anxious due to feeling like he is not able to breathe and cannot 

even swallow liquids. Patient also is noted to have fever. He denies any 

abdominal pain.


He has 3 young children at home, works for the [employer], some sick contacts at 

work. He smokes a bit, drinks little, and occasionally uses MJ, but does not 

vape.


His significant other notes he has vocal changes as well.


CXR was clear in ED, however had fever 102.4F, , WBC of 22.5 and RR of 

26/min. He improved only slightly after steroid administration and was called 

for admission for further treatment.





CT neck confirms epiglottitis. Steroids, racemic epi, antibiotics. Consulted ID 

for further recommendations who agree with cefdinir, doxycycline, and prednisone

on d/c with strict return instructions.





Problem list 


Shortness of breath -  epliglottis given his dysphagia and dysphonia. Needs prn 

racemic epinephrine, nebs, steroids, empiric antibiotics to cover for strep, h. 

influenza, etc. though this might be viral. Confirmed with CT neck stat to check

for abscess. consulted ID


Dysphagia - will check CT as above. Have SLP evaluate, though this is likely 

epiglottitis


Dysphonia - as above


Sepsis - likely 2/2 epliglottitis, will have empiric antibiotics, fluids given





Greater than 30 minutes spent on d/c





Discharge Information


Condition at Discharge:  Improved


Follow Up:  Weeks (1)


Disposition/Orders:  D/C to Home


Scheduled


Cefdinir (Cefdinir) 300 Mg Capsule, 300 MG PO BID for Epiglottitis for 5 Days, 

#10


   Prescribed by: FLOR COCHRAN MD on 11/14/19 1042


Doxycycline Hyclate (Doxycycline Hyclate) 100 Mg Tablet, 100 MG PO BID for 

epiglottitis for 5 Days, #10


   Prescribed by: FLOR COCHRAN MD on 11/14/19 1042


Prednisone (Prednisone) 20 Mg Tablet, 20 MG PO DAILY for Epiglottitis for 5 

Days, #5


   Prescribed by: FLOR COCHRAN MD on 11/14/19 1042











FLOR COCHRAN MD        Nov 14, 2019 22:52

## 2019-11-14 NOTE — CONS
DATE OF CONSULTATION:  2019



REFERRING PHYSICIAN:  Dr. Marin.



REASON FOR CONSULTATION:  Epiglottitis.



HISTORY OF PRESENT ILLNESS:  A 28-year-old male healthy without any significant

medical problems, presented to the ER with complaints of severe throat, which

started the night before.  He has felt like his throat was closing upon him and

had started choking on his saliva.  He also had an episode of vomiting.  He was

feverish and started getting anxious, as he was not able to breathe.  He was

found to have a temperature of 102 with white count of 22,000.  In the ED,

sodium was 140, creatinine was within normal limits.  UA was negative.  He was

given a dose of ceftriaxone, diphenhydramine, ibuprofen, ketorolac and steroids

with mouthwash.  He underwent a chest x-ray, which showed no acute

cardiopulmonary process.  He also had a CT of the soft tissue of the neck, which

showed significant diffuse thickening of the epiglottitis and false cords.  No

loculated collection.  No definite inflammatory stranding.  No enlarged cervical

lymph nodes caries involvement of the right upper third molar.  The patient had

some ear discomfort and pain, had some right-sided neck pain.  He was started on

Rocephin and doxycycline, was continued on IV methylprednisolone.  Today, he

feels much better.  He denies any headache, nausea, vomiting, diarrhea,

abdominal pain, difficulty swallowing, chest pain, chest pain with deep

breathing, sore throat, ear pain, drainage, neck pain,  symptoms, rash, or

joint pain.  The patient denies any sick contact at home.  His wife and children

are healthy.  He works at [employer] and had some exposure at work prior to

admission.  



PAST MEDICAL HISTORY:  Nothing significant.



CURRENT MEDICATION:  Rocephin, doxycycline and steroids.



PHYSICAL EXAMINATION:

VITAL SIGNS:  Temperature 97.9, pulse 84, respirations 16, blood pressure 120/69

and oxygen saturation 99% on room air.

GENERAL:  Alert and oriented x 3 male, in no acute distress, pleasant,

cooperative.

HEENT:  Normocephalic, atraumatic, anicteric.  No thrush.  Oral mucosa moist. 

Mild tonsillar enlargement, right greater than the left, inflamed epiglottitis. 

No evidence of obstruction, mild right cervical lymphadenopathy.

NECK:  Supple, no JVD.

LUNGS:  Clear bilaterally.  No wheezing.

HEART:  S1, S2.  No gallops or murmurs.

ABDOMEN:  Soft, nontender, nondistended.

EXTREMITIES:  No edema, no cyanosis.

DERMATOLOGIC:  Warm, dry.  No generalized rash.

NEUROLOGIC:  Alert and oriented x 3, grossly nonfocal.

PSYCHIATRIC:  Cooperative, appropriate mood and affect.

MUSCULOSKELETAL:  No joint swelling seen.



LABORATORY DATA:  WBC 22.5, hemoglobin 13.7, hematocrit 40.6, platelets 266 and

neutrophil 84%.  Sodium 140, potassium 3.9, chloride 103, bicarbonate 27, BUN

13, creatinine 1.1, glucose 114.  LFTs within normal limits.  UA negative. 

Monospot test negative.  Group A strep negative.  Influenza screen negative.



IMAGIN.  Chest x-ray, no acute abnormality.

2.  Soft tissue neck CT as above.



IMPRESSION:

1.  Epiglottitis improved.

2.  Leukocytosis secondary to above.

3.  Sepsis secondary to above.

4.  Shortness of breath, resolved.



RECOMMENDATIONS:

1.  Continue Rocephin and doxycycline.

2.  Continue supportive care.

3.  Maintain aspiration precaution.

4.  Follow up labs and cultures.

5.  Discussed with wife at bedside.



Thank you, Dr. Marin for consulting Infectious Disease to participate in this

patient's care.  If you have any questions, do not hesitate to contact me.

 



______________________________

MARGARITA VILLANUEVA MD DR:  MARK/nts  JOB#:  [id number] / [id number]

DD:  2019 11:15  DT:  2019 11:45

NELI

## 2019-11-14 NOTE — PDOC
PROGRESS NOTES


Chief Complaint


Chief Complaint


A/P:


Shortness of breath - sounds like tracheitis, epliglottis given his dysphagia 

and dysphonia. Needs prn racemic epinephrine, nebs, steroids, empiric 

antibiotics to cover for strep, h. influenza, etc. though this might be viral. 

Will get CT neck stat to check for abscess. consult ID


Dysphagia - will check CT as above. Have SLP evaluate, though this is likely 

epiglottitis


Dysphonia - as above


Sepsis - likely 2/2 epliglottitis, will have empiric antibiotics, fluids given





FEN - General diet


PPX - SCDs


FULL CODE


Dispo - inpatient for sepsis, life-threatening epiglottitis.





History of Present Illness


History of Present Illness


Mr Bobo 29 yo male with no PMHx who presents with complaint of severe sore 

throat that started at about 10 PM. Patient states that he feels like his throat

is closing up on him and he has been choking on a saliva. He indicates that he 

has had some vomiting as well and has had some hemoptysis. Patient does admit to

feeling very anxious due to feeling like he is not able to breathe and cannot 

even swallow liquids. Patient also is noted to have fever. He denies any 

abdominal pain.


He has 3 young children at home, works for the [employer], some sick contacts at 

work. He smokes a bit, drinks little, and occasionally uses MJ, but does not 

vape.


His significant other notes he has vocal changes as well.


CXR was clear in ED, however had fever 102.4F, , WBC of 22.5 and RR of 

26/min. He improved only slightly after steroid administration and was called 

for admission for further treatment.





CT neck confirms epiglottitis. Steroids, racemic epi, antibiotics. Consulted ID 

for further recommendations who agree with cefdinir, doxycycline, and prednisone

on d/c with strict return instructions.





Vitals


Vitals





Vital Signs








  Date Time  Temp Pulse Resp B/P (MAP) Pulse Ox O2 Delivery O2 Flow Rate FiO2


 


11/14/19 07:28      Room Air  


 


11/14/19 07:19     99   


 


11/14/19 07:00 97.9 84 16 120/69 (86)    





 97.9       











Physical Exam


General:  Alert, Oriented X3, Cooperative, No acute distress


Abdomen:  Normal bowel sounds, Soft, No tenderness, No hepatosplenomegaly, No 

masses


Extremities:  No clubbing, No cyanosis, No edema, Normal pulses, No 

tenderness/swelling


Skin:  No rashes, No breakdown, No significant lesion





Assessment and Plan


Assessmemt and Plan


Problems


Medical Problems:


(1) Fever


Status: Acute  





(2) Leukocytosis


Status: Acute  





(3) Tonsillitis


Status: Acute  











Comment


Review of Relevant


I have reviewed the following items nila (where applicable) has been applied.


Labs





Laboratory Tests








Test


 11/13/19


02:30 11/13/19


02:48 11/13/19


03:07 11/13/19


04:43


 


White Blood Count


 22.5 x10^3/uL


(4.0-11.0) 


 


 





 


Red Blood Count


 4.62 x10^6/uL


(4.30-5.70) 


 


 





 


Hemoglobin


 13.7 g/dL


(13.0-17.5) 


 


 





 


Hematocrit


 40.6 %


(39.0-53.0) 


 


 





 


Mean Corpuscular Volume 88 fL ()    


 


Mean Corpuscular Hemoglobin 30 pg (25-35)    


 


Mean Corpuscular Hemoglobin


Concent 34 g/dL


(31-37) 


 


 





 


Red Cell Distribution Width


 13.1 %


(11.5-14.5) 


 


 





 


Platelet Count


 266 x10^3/uL


(140-400) 


 


 





 


Neutrophils (%) (Auto) 84 % (31-73)    


 


Lymphocytes (%) (Auto) 8 % (24-48)    


 


Monocytes (%) (Auto) 7 % (0-9)    


 


Eosinophils (%) (Auto) 1 % (0-3)    


 


Basophils (%) (Auto) 0 % (0-3)    


 


Neutrophils # (Auto)


 18.8 x10^3/uL


(1.8-7.7) 


 


 





 


Lymphocytes # (Auto)


 1.9 x10^3/uL


(1.0-4.8) 


 


 





 


Monocytes # (Auto)


 1.6 x10^3/uL


(0.0-1.1) 


 


 





 


Eosinophils # (Auto)


 0.2 x10^3/uL


(0.0-0.7) 


 


 





 


Basophils # (Auto)


 0.1 x10^3/uL


(0.0-0.2) 


 


 





 


Segmented Neutrophils % 87 % (35-66)    


 


Band Neutrophils % 2 % (0-9)    


 


Lymphocytes % 9 % (24-48)    


 


Monocytes % 2 % (0-10)    


 


Toxic Granulation Slight    


 


Toxic Vacuolation Slight    


 


Platelet Estimate


 Adequate


(ADEQUATE) 


 


 





 


Heterophil Agglutinins


 Negative


(NEGATIVE) 


 


 





 


Group A Streptococcus Rapid


 Negative


(NEGATIVE) 


 


 





 


Influenza Type A Antigen


 


 Negative


(NEGATIVE) 


 





 


Influenza Type B Antigen


 


 Negative


(NEGATIVE) 


 





 


Sodium Level


 


 


 140 mmol/L


(136-145) 





 


Potassium Level


 


 


 3.9 mmol/L


(3.5-5.1) 





 


Chloride Level


 


 


 103 mmol/L


() 





 


Carbon Dioxide Level


 


 


 27 mmol/L


(21-32) 





 


Anion Gap   10 (6-14)  


 


Blood Urea Nitrogen


 


 


 13 mg/dL


(8-26) 





 


Creatinine


 


 


 1.1 mg/dL


(0.7-1.3) 





 


Estimated GFR


(Cockcroft-Gault) 


 


 95.8 


 





 


Glucose Level


 


 


 114 mg/dL


(70-99) 





 


Calcium Level


 


 


 9.2 mg/dL


(8.5-10.1) 





 


Total Bilirubin


 


 


 0.6 mg/dL


(0.2-1.0) 





 


Direct Bilirubin


 


 


 0.2 mg/dL


(0.0-0.2) 





 


Aspartate Amino Transf


(AST/SGOT) 


 


 20 U/L (15-37) 


 





 


Alanine Aminotransferase


(ALT/SGPT) 


 


 24 U/L (16-63) 


 





 


Alkaline Phosphatase


 


 


 57 U/L


() 





 


Total Protein


 


 


 7.9 g/dL


(6.4-8.2) 





 


Albumin


 


 


 4.3 g/dL


(3.4-5.0) 





 


Urine Collection Type    Unknown 


 


Urine Color    Yellow 


 


Urine Clarity    Clear 


 


Urine pH    8.0 


 


Urine Specific Gravity    1.025 


 


Urine Protein


 


 


 


 Negative mg/dL


(NEG-TRACE)


 


Urine Glucose (UA)


 


 


 


 Negative mg/dL


(NEG)


 


Urine Ketones (Stick)


 


 


 


 Negative mg/dL


(NEG)


 


Urine Blood    Negative (NEG) 


 


Urine Nitrite    Negative (NEG) 


 


Urine Bilirubin    Negative (NEG) 


 


Urine Urobilinogen Dipstick


 


 


 


 1.0 mg/dL (0.2


mg/dL)


 


Urine Leukocyte Esterase    Negative (NEG) 


 


Urine RBC    3-5 /HPF (0-2) 


 


Urine WBC


 


 


 


 5-10 /HPF


(0-4)


 


Urine Squamous Epithelial


Cells 


 


 


 Few /LPF 





 


Urine Bacteria    0 /HPF (0-FEW) 


 


Urine Mucus    Slight /LPF 








Microbiology


11/13/19 Blood Culture - Preliminary, Resulted


           NO GROWTH AFTER 1 DAY


Medications





Current Medications


Diphenhydramine HCl (Benadryl) 50 mg 1X  ONCE IVP  Last administered on 

11/13/19at 02:56;  Start 11/13/19 at 03:00;  Stop 11/13/19 at 03:01;  Status DC


Methylprednisolone Sodium Succinate (SOLU-Medrol 125MG VIAL) 125 mg 1X  ONCE IV 

Last administered on 11/13/19at 02:55;  Start 11/13/19 at 03:00;  Stop 11/13/19 

at 03:01;  Status DC


Lorazepam (Ativan Inj) 1 mg 1X  ONCE IVP  Last administered on 11/13/19at 02:56;

 Start 11/13/19 at 03:00;  Stop 11/13/19 at 03:01;  Status DC


Ceftriaxone Sodium (Rocephin) 1 gm 1X  ONCE IVP  Last administered on 11/13/19at

02:57;  Start 11/13/19 at 03:00;  Stop 11/13/19 at 03:01;  Status DC


Ibuprofen (Children'S Motrin) 600 mg 1X  ONCE PO ;  Start 11/13/19 at 03:00;  

Stop 11/13/19 at 03:01;  Status DC


Ketorolac Tromethamine (Toradol 30mg Vial) 30 mg 1X  ONCE IVP  Last administered

on 11/13/19at 03:34;  Start 11/13/19 at 04:00;  Stop 11/13/19 at 04:01;  Status 

DC


Multi-Ingredient Mouthwash/Gargle (Gi Cocktail) 20 ml 1X  ONCE SWSW  Last 

administered on 11/13/19at 03:33;  Start 11/13/19 at 04:00;  Stop 11/13/19 at 

04:01;  Status DC


Sodium Chloride 1,000 ml @  1,000 mls/hr 1X  ONCE IV  Last administered on 

11/13/19at 04:02;  Start 11/13/19 at 04:00;  Stop 11/13/19 at 04:59;  Status DC


Lorazepam (Ativan Inj) 1 mg 1X  ONCE IVP ;  Start 11/13/19 at 04:00;  Stop 

11/13/19 at 04:01;  Status DC


Ondansetron HCl (Zofran) 4 mg PRN Q8HRS  PRN IV NAUSEA/VOMITING 1ST CHOICE;  

Start 11/13/19 at 05:45;  Stop 11/14/19 at 05:44;  Status DC


Morphine Sulfate (Morphine Sulfate) 2 mg PRN Q2HR  PRN IV SEVERE PAIN 7-10 Last 

administered on 11/14/19at 02:50;  Start 11/13/19 at 05:45;  Stop 11/14/19 at 

05:44;  Status DC


Sodium Chloride 1,000 ml @  125 mls/hr Q8H IV  Last administered on 11/13/19at 

23:18;  Start 11/13/19 at 06:00;  Stop 11/14/19 at 05:59;  Status DC


Acetaminophen (Tylenol) 650 mg PRN Q4HRS  PRN PO FEVER;  Start 11/13/19 at 

05:45;  Stop 11/14/19 at 05:44;  Status DC


Iohexol (Omnipaque 300 Mg/ml) 70 ml 1X  ONCE IV  Last administered on 11/13/19at

11:43;  Start 11/13/19 at 11:30;  Stop 11/13/19 at 11:31;  Status DC


Info (CONTRAST GIVEN -- Rx MONITORING) 1 each PRN DAILY  PRN MC SEE COMMENTS;  

Start 11/13/19 at 11:30;  Stop 11/15/19 at 11:29


Albuterol/ Ipratropium (Duoneb) 3 ml RTQID NEB  Last administered on 11/14/19at 

07:17;  Start 11/13/19 at 16:00


Budesonide (Pulmicort) 0.5 mg RTBID NEB  Last administered on 11/14/19at 07:18; 

Start 11/13/19 at 20:00


Methylprednisolone Sodium Succinate (SOLU-Medrol 40MG VIAL) 40 mg Q8HRS IV  Last

administered on 11/14/19at 06:21;  Start 11/13/19 at 14:00


Ceftriaxone Sodium (Rocephin) 1 gm Q24H IVP ;  Start 11/13/19 at 14:00;  Status 

Cancel


Doxycycline Hyclate 100 mg/ Dextrose 100 ml @  50 mls/hr Q12HR IV  Last 

administered on 11/14/19at 07:48;  Start 11/13/19 at 14:00


Ceftriaxone Sodium (Rocephin) 1 gm Q24H IVP  Last administered on 11/13/19at 

21:08;  Start 11/13/19 at 21:00


Lactobacillus Rhamnosus (Culturelle) 1 cap BID PO  Last administered on 

11/14/19at 07:47;  Start 11/13/19 at 21:00


Epinephrine (S2 Racepinephrine) 0.5 ml 1X PRN  ONCE NEB  Last administered on 

11/13/19at 17:46;  Start 11/13/19 at 16:30;  Stop 11/13/19 at 16:31;  Status DC


Vitals/I & O





Vital Sign - Last 24 Hours








 11/13/19 11/13/19 11/13/19 11/13/19





 11:00 15:00 15:10 15:26


 


Temp 97.9 98.2  





 97.9 98.2  


 


Pulse 81 82  


 


Resp 16 18  


 


B/P (MAP) 132/70 (90) 126/77 (93)  


 


Pulse Ox 99 96  98


 


O2 Delivery Room Air Room Air Room Air Room Air


 


    





    





 11/13/19 11/13/19 11/13/19 11/13/19





 15:40 17:31 17:47 18:01


 


Pulse Ox   99 


 


O2 Delivery Room Air Room Air Room Air Room Air





 11/13/19 11/13/19 11/13/19 11/13/19





 19:00 19:35 20:00 20:05


 


Temp 98.1   





 98.1   


 


Pulse 90   


 


Resp 18   


 


B/P (MAP) 107/57 (74)   


 


Pulse Ox 96   


 


O2 Delivery Room Air Room Air Room Air Room Air


 


    





    





 11/13/19 11/13/19 11/14/19 11/14/19





 20:12 23:00 02:50 03:00


 


Temp  98.2  97.6





  98.2  97.6


 


Pulse  89  74


 


Resp  18  18


 


B/P (MAP)  96/47 (63)  114/68 (83)


 


Pulse Ox 100 94  98


 


O2 Delivery Room Air Room Air Room Air Room Air


 


    





    





 11/14/19 11/14/19 11/14/19 11/14/19





 03:20 07:00 07:19 07:28


 


Temp  97.9  





  97.9  


 


Pulse  84  


 


Resp  16  


 


B/P (MAP)  120/69 (86)  


 


Pulse Ox  97 99 


 


O2 Delivery Room Air Room Air Room Air Room Air

















FLOR COCHRAN MD        Nov 14, 2019 10:34